# Patient Record
Sex: MALE | Race: WHITE | NOT HISPANIC OR LATINO | Employment: FULL TIME | URBAN - METROPOLITAN AREA
[De-identification: names, ages, dates, MRNs, and addresses within clinical notes are randomized per-mention and may not be internally consistent; named-entity substitution may affect disease eponyms.]

---

## 2018-12-10 ENCOUNTER — OFFICE VISIT (OUTPATIENT)
Dept: URGENT CARE | Facility: CLINIC | Age: 48
End: 2018-12-10
Payer: COMMERCIAL

## 2018-12-10 ENCOUNTER — HOSPITAL ENCOUNTER (OUTPATIENT)
Dept: RADIOLOGY | Facility: MEDICAL CENTER | Age: 48
End: 2018-12-10
Attending: PHYSICIAN ASSISTANT
Payer: COMMERCIAL

## 2018-12-10 VITALS
HEART RATE: 88 BPM | WEIGHT: 210 LBS | TEMPERATURE: 98.2 F | OXYGEN SATURATION: 98 % | HEIGHT: 70 IN | DIASTOLIC BLOOD PRESSURE: 85 MMHG | SYSTOLIC BLOOD PRESSURE: 140 MMHG | RESPIRATION RATE: 20 BRPM | BODY MASS INDEX: 30.06 KG/M2

## 2018-12-10 DIAGNOSIS — S67.191A CRUSHING INJURY OF LEFT INDEX FINGER, INITIAL ENCOUNTER: ICD-10-CM

## 2018-12-10 DIAGNOSIS — S62.639B OPEN FRACTURE OF TUFT OF DISTAL PHALANX OF FINGER: Primary | ICD-10-CM

## 2018-12-10 PROCEDURE — 73140 X-RAY EXAM OF FINGER(S): CPT | Mod: LT

## 2018-12-10 PROCEDURE — 99204 OFFICE O/P NEW MOD 45 MIN: CPT | Performed by: PHYSICIAN ASSISTANT

## 2018-12-10 RX ORDER — HYDROCODONE BITARTRATE AND ACETAMINOPHEN 5; 325 MG/1; MG/1
1-2 TABLET ORAL EVERY 4 HOURS PRN
Qty: 20 TAB | Refills: 0 | Status: SHIPPED | OUTPATIENT
Start: 2018-12-10 | End: 2018-12-13

## 2018-12-10 RX ORDER — SULFAMETHOXAZOLE AND TRIMETHOPRIM 800; 160 MG/1; MG/1
1 TABLET ORAL 2 TIMES DAILY
Qty: 14 TAB | Refills: 0 | Status: SHIPPED | OUTPATIENT
Start: 2018-12-10 | End: 2018-12-17

## 2018-12-10 NOTE — PATIENT INSTRUCTIONS
Crush Injury, Fingers or Toes  A crush injury to the fingers or toes means the tissues have been damaged by being squeezed (compressed). There will be bleeding into the tissues and swelling. Often, blood will collect under the skin. When this happens, the skin on the finger often dies and may slough off (shed) 1 week to 10 days later. Usually, new skin is growing underneath. If the injury has been too severe and the tissue does not survive, the damaged tissue may begin to turn black over several days.   Wounds which occur because of the crushing may be stitched (sutured) shut. However, crush injuries are more likely to become infected than other injuries. These wounds may not be closed as tightly as other types of cuts to prevent infection. Nails involved are often lost. These usually grow back over several weeks.   DIAGNOSIS  X-rays may be taken to see if there is any injury to the bones.  TREATMENT  Broken bones (fractures) may be treated with splinting, depending on the fracture. Often, no treatment is required for fractures of the last bone in the fingers or toes.  HOME CARE INSTRUCTIONS   · The crushed part should be raised (elevated) above the heart or center of the chest as much as possible for the first several days or as directed. This helps with pain and lessens swelling. Less swelling increases the chances that the crushed part will survive.  · Put ice on the injured area.  ¨ Put ice in a plastic bag.  ¨ Place a towel between your skin and the bag.  ¨ Leave the ice on for 15-20 minutes, 03-04 times a day for the first 2 days.  · Only take over-the-counter or prescription medicines for pain, discomfort, or fever as directed by your caregiver.  · Use your injured part only as directed.  · Change your bandages (dressings) as directed.  · Keep all follow-up appointments as directed by your caregiver. Not keeping your appointment could result in a chronic or permanent injury, pain, and disability. If there is  any problem keeping the appointment, you must call to reschedule.  SEEK IMMEDIATE MEDICAL CARE IF:   · There is redness, swelling, or increasing pain in the wound area.  · Pus is coming from the wound.  · You have a fever.  · You notice a bad smell coming from the wound or dressing.  · The edges of the wound do not stay together after the sutures have been removed.  · You are unable to move the injured finger or toe.  MAKE SURE YOU:   · Understand these instructions.  · Will watch your condition.  · Will get help right away if you are not doing well or get worse.     This information is not intended to replace advice given to you by your health care provider. Make sure you discuss any questions you have with your health care provider.     Document Released: 12/18/2006 Document Revised: 03/11/2013 Document Reviewed: 05/04/2012  [employer] Interactive Patient Education ©2016 [employer]

## 2018-12-10 NOTE — PROGRESS NOTES
Subjective:      Pt is a 48 y.o. male who presents with Finger Injury (Smash left infdex last night .)            HPI  This is a new problem. Pt notes while hammering a nail last night at home he missed the nail and smashed his left index finger causing damage to the nail and bleeding of the finger with crush injury to the finger tip and acryillic nail he is wearing shattered in pieces. Pt has not taken any Rx medications for this condition. Pt states the pain is a 5/10, aching in nature and worse at night. Pt denies CP, SOB, NVD, paresthesias, headaches, dizziness, change in vision, hives, or other joint pain. The pt's medication list, problem list, and allergies have been evaluated and reviewed during today's visit.      PMH:  Negative per pt.      PSH:  Negative per pt.      Fam Hx:  the patient's family history is not pertinent to their current complaint    Soc HX:  Social History     Social History   • Marital status:      Spouse name: N/A   • Number of children: N/A   • Years of education: N/A     Occupational History   • Not on file.     Social History Main Topics   • Smoking status: Never Smoker   • Smokeless tobacco: Former User     Types: Chew     Quit date: 1/10/2010   • Alcohol use Not on file   • Drug use: Unknown   • Sexual activity: Not on file     Other Topics Concern   • Not on file     Social History Narrative   • No narrative on file         Medications:    Current Outpatient Prescriptions:   •  sulfamethoxazole-trimethoprim (BACTRIM DS) 800-160 MG tablet, Take 1 Tab by mouth 2 times a day for 7 days., Disp: 14 Tab, Rfl: 0      Allergies:  Patient has no known allergies.    ROS  Constitutional: Negative for fever, chills and malaise/fatigue.   HENT: Negative for congestion and sore throat.    Eyes: Negative for blurred vision, double vision and photophobia.   Respiratory: Negative for cough and shortness of breath.  Cardiovascular: Negative for chest pain and palpitations.    "  Gastrointestinal: Negative for heartburn, nausea, vomiting, abdominal pain, diarrhea and constipation.   Genitourinary: Negative for dysuria and flank pain.   Musculoskeletal: POS for left index finger crush injury with joint pain and myalgias.   Skin: Negative for itching and rash.   Neurological: Negative for dizziness, tingling and headaches.   Endo/Heme/Allergies: Does not bruise/bleed easily.   Psychiatric/Behavioral: Negative for depression. The patient is not nervous/anxious.           Objective:     /85 (BP Location: Left arm, Patient Position: Sitting, BP Cuff Size: Adult)   Pulse 88   Temp 36.8 °C (98.2 °F) (Temporal)   Resp 20   Ht 1.778 m (5' 10\")   Wt 95.3 kg (210 lb)   SpO2 98%   BMI 30.13 kg/m²      Physical Exam   Musculoskeletal:        Left hand: He exhibits decreased range of motion, tenderness, bony tenderness, disruption of two-point discrimination, deformity, laceration and swelling. He exhibits normal capillary refill. Normal sensation noted. Normal strength noted. He exhibits no finger abduction, no thumb/finger opposition and no wrist extension trouble.        Hands:        Constitutional: PT is oriented to person, place, and time. PT appears well-developed and well-nourished. No distress.   HENT:   Head: Normocephalic and atraumatic.   Mouth/Throat: Oropharynx is clear and moist. No oropharyngeal exudate.   Eyes: Conjunctivae normal and EOM are normal. Pupils are equal, round, and reactive to light.   Neck: Normal range of motion. Neck supple. No thyromegaly present.   Cardiovascular: Normal rate, regular rhythm, normal heart sounds and intact distal pulses.  Exam reveals no gallop and no friction rub.    No murmur heard.  Pulmonary/Chest: Effort normal and breath sounds normal. No respiratory distress. PT has no wheezes. PT has no rales. Pt exhibits no tenderness.   Abdominal: Soft. Bowel sounds are normal. PT exhibits no distension and no mass. There is no tenderness. There " is no rebound and no guarding.   Neurological: PT is alert and oriented to person, place, and time. PT has normal reflexes. No cranial nerve deficit.   Skin: Skin is warm and dry. No rash noted. PT is not diaphoretic. No erythema.       Psychiatric: PT has a normal mood and affect. PT behavior is normal. Judgment and thought content normal.     RADS:  Narrative       12/10/2018 9:03 AM    HISTORY/REASON FOR EXAM:  Pain/Deformity Following Trauma.  Crush injury.    TECHNIQUE/EXAM DESCRIPTION AND NUMBER OF VIEWS:  3 views of the LEFT fingers.    COMPARISON: None    FINDINGS:  There is a comminuted fracture of the distal phalangeal tuft of the left index finger which is compatible with history of crush injury area the fracture fragments are not significantly displaced. There appears to be soft tissue injury along the dorsum of   the area of fracture.   Impression       Comminuted, nondisplaced fracture of the distal phalangeal tuft of the left index finger with adjacent soft tissue injury.   Reading Provider Reading Date   Shante Roman M.D. Dec 10, 2018   Signing Provider Signing Date Signing Time   Shante Roman M.D. Dec 10, 2018  9:12 AM          Assessment/Plan:     1. OPEN Left index finger tuft fx- comminuted    2. Crushing injury of left index finger, initial encounter    - DX-FINGER(S) 2+ LEFT; Future  - sulfamethoxazole-trimethoprim (BACTRIM DS) 800-160 MG tablet; Take 1 Tab by mouth 2 times a day for 7 days.  Dispense: 14 Tab; Refill: 0  Opioid consent  NSAIDs for pain 1-5, Norco for pain 6-10 or to help get to sleep.  [facility] Aware web site evaluation: I have obtained and reviewed patient utilization report from [facility] pharmacy database prior to writing prescription for controlled substance II, III or IV per [place] bill . Based on the report and my clinical assessment the prescription is medically necessary.   RICE therapy discussed  Ref to sports med placed--> to see on 12/11/18 at 2 pm for  Dr. Patton  Spoke with Dr. Gansert at the ER and he agreed with the plan  Gentle ROM exercises discussed  WBAT left hand with caution  Ice/heat therapy discussed  Rest, fluids encouraged.  AVS with medical info given.  Pt was in full understanding and agreement with the plan.  Follow-up as needed if symptoms worsen or fail to improve.

## 2018-12-11 ENCOUNTER — OFFICE VISIT (OUTPATIENT)
Dept: MEDICAL GROUP | Facility: CLINIC | Age: 48
End: 2018-12-11
Payer: COMMERCIAL

## 2018-12-11 VITALS
TEMPERATURE: 98.7 F | HEART RATE: 86 BPM | HEIGHT: 70 IN | SYSTOLIC BLOOD PRESSURE: 126 MMHG | WEIGHT: 210 LBS | OXYGEN SATURATION: 95 % | BODY MASS INDEX: 30.06 KG/M2 | RESPIRATION RATE: 18 BRPM | DIASTOLIC BLOOD PRESSURE: 84 MMHG

## 2018-12-11 DIAGNOSIS — S62.639B OPEN FRACTURE OF TUFT OF DISTAL PHALANX OF FINGER: ICD-10-CM

## 2018-12-11 PROCEDURE — 26750 TREAT FINGER FRACTURE EACH: CPT | Mod: F1 | Performed by: FAMILY MEDICINE

## 2018-12-11 ASSESSMENT — ENCOUNTER SYMPTOMS
NAUSEA: 0
HEADACHES: 0
VOMITING: 0
FEVER: 0
CHILLS: 0
SHORTNESS OF BREATH: 0
DIZZINESS: 0

## 2018-12-11 NOTE — PROGRESS NOTES
"Subjective:      Baldemar Garcia is a 48 y.o. male who presents with Finger Injury (Referral from / L index finger injury )      Referred by Luis Alvarenga PA-C for evaluation of LEFT index finger pain     HPI   LEFT index finger pain   Date of injury, Sunday, December 9, 2018  Hammering a nail at home and struck his LEFT index finger inadvertently with the hammer  Sudden sharp pain at the LEFT index finger  No radiation  Improved with rest and immobilization  Worse with pressure or movement  No night symptoms  Denies any prior issues with the LEFT index finger  Taking ibuprofen for pain which is helping    Review of Systems   Constitutional: Negative for chills and fever.   Respiratory: Negative for shortness of breath.    Cardiovascular: Negative for chest pain.   Gastrointestinal: Negative for nausea and vomiting.   Neurological: Negative for dizziness and headaches.     PMH:  has no past medical history on file.  MEDS:   Current Outpatient Prescriptions:   •  sulfamethoxazole-trimethoprim (BACTRIM DS) 800-160 MG tablet, Take 1 Tab by mouth 2 times a day for 7 days., Disp: 14 Tab, Rfl: 0  •  HYDROcodone-acetaminophen (NORCO) 5-325 MG Tab per tablet, Take 1-2 Tabs by mouth every four hours as needed for up to 3 days., Disp: 20 Tab, Rfl: 0  ALLERGIES: No Known Allergies  SURGHX:   Past Surgical History:   Procedure Laterality Date   • FEMUR ORIF Right     femur   • ORCHIECTOMY Right      SOCHX:  reports that he has never smoked. He quit smokeless tobacco use about 8 years ago. His smokeless tobacco use included Chew.  FH: Family history was reviewed, no pertinent findings to report     Objective:     /84 (BP Location: Right arm, Patient Position: Sitting, BP Cuff Size: Adult)   Pulse 86   Temp 37.1 °C (98.7 °F) (Temporal)   Resp 18   Ht 1.778 m (5' 10\")   Wt 95.3 kg (210 lb)   SpO2 95%   BMI 30.13 kg/m²       Physical Exam     Hand exam    NAD  Alert and oriented    BILATERAL hand " exam  POSITIVE swelling at the tip of the LEFT index finger  NO deformity  Range of motion of all MCP, DIP and PIP joints NORMAL  Grind test is NEGATIVE  Collateral ligament testing is NORMAL       Assessment/Plan:     1. Open fracture of tuft of distal phalanx of finger      LEFT index     Continue fracture stabilization in aluminum finger splint  Splint was adjusted to allow flexion and extension at the PIP joint    Date of injury, Sunday, December 9, 2018  The plan is to continue finger immobilization for a total of 6 weeks   (removal on or after January 22, 2019)    Return in about 3 days (around 12/14/2018).  For wound check          12/10/2018 9:03 AM    HISTORY/REASON FOR EXAM:  Pain/Deformity Following Trauma.  Crush injury.    TECHNIQUE/EXAM DESCRIPTION AND NUMBER OF VIEWS:  3 views of the LEFT fingers.    COMPARISON: None    FINDINGS:  There is a comminuted fracture of the distal phalangeal tuft of the left index finger which is compatible with history of crush injury area the fracture fragments are not significantly displaced. There appears to be soft tissue injury along the dorsum of   the area of fracture.   Impression       Comminuted, nondisplaced fracture of the distal phalangeal tuft of the left index finger with adjacent soft tissue injury.     Interpreted in the office today with the patient    Thank you Luis Alvarenga PA-C for allowing me to participate in caring for the patient.

## 2018-12-14 ENCOUNTER — OFFICE VISIT (OUTPATIENT)
Dept: MEDICAL GROUP | Facility: CLINIC | Age: 48
End: 2018-12-14
Payer: COMMERCIAL

## 2018-12-14 VITALS
WEIGHT: 210 LBS | TEMPERATURE: 98.1 F | BODY MASS INDEX: 30.06 KG/M2 | HEIGHT: 70 IN | RESPIRATION RATE: 16 BRPM | OXYGEN SATURATION: 97 % | SYSTOLIC BLOOD PRESSURE: 120 MMHG | DIASTOLIC BLOOD PRESSURE: 76 MMHG | HEART RATE: 82 BPM

## 2018-12-14 DIAGNOSIS — S62.639B OPEN FRACTURE OF TUFT OF DISTAL PHALANX OF FINGER: ICD-10-CM

## 2018-12-14 DIAGNOSIS — T14.8XXA OPEN WOUND: ICD-10-CM

## 2018-12-14 PROCEDURE — 99212 OFFICE O/P EST SF 10 MIN: CPT | Mod: 24 | Performed by: FAMILY MEDICINE

## 2018-12-15 NOTE — PROGRESS NOTES
"Subjective:      FOLLOW up LEFT index finger pain     HPI   LEFT index finger distal tuft fracture  Date of injury, Sunday, December 9, 2018  Hammering a nail at home and struck his LEFT index finger inadvertently with the hammer  Pain is same     Objective:     /76 (BP Location: Right arm, Patient Position: Sitting, BP Cuff Size: Adult)   Pulse 82   Temp 36.7 °C (98.1 °F) (Temporal)   Resp 16   Ht 1.778 m (5' 10\")   Wt 95.3 kg (210 lb)   SpO2 97%   BMI 30.13 kg/m²     Physical Exam     Hand exam    NAD  Alert and oriented    BILATERAL hand exam  POSITIVE swelling at the tip of the LEFT index finger  NO deformity  Range of motion of all MCP, DIP and PIP joints NORMAL  Grind test is NEGATIVE  Collateral ligament testing is NORMAL     Assessment/Plan:     1. Open fracture of tuft of distal phalanx of finger     2. Open wound  REFERRAL TO WOUND CLINIC     Continue fracture stabilization in aluminum finger splint    Date of injury, Sunday, December 9, 2018  The plan is to continue finger immobilization for a total of 6 weeks   (removal on or after January 22, 2019)    Would is looking good, but due to extent recommend wound care consult    Return in about 1 week          12/10/2018 9:03 AM    HISTORY/REASON FOR EXAM:  Pain/Deformity Following Trauma.  Crush injury.    TECHNIQUE/EXAM DESCRIPTION AND NUMBER OF VIEWS:  3 views of the LEFT fingers.    COMPARISON: None    FINDINGS:  There is a comminuted fracture of the distal phalangeal tuft of the left index finger which is compatible with history of crush injury area the fracture fragments are not significantly displaced. There appears to be soft tissue injury along the dorsum of   the area of fracture.   Impression       Comminuted, nondisplaced fracture of the distal phalangeal tuft of the left index finger with adjacent soft tissue injury.     Thank you Luis Alvarenga PA-C for allowing me to participate in caring for the patient.  "

## 2018-12-21 ENCOUNTER — OFFICE VISIT (OUTPATIENT)
Dept: MEDICAL GROUP | Facility: CLINIC | Age: 48
End: 2018-12-21
Payer: COMMERCIAL

## 2018-12-21 ENCOUNTER — APPOINTMENT (OUTPATIENT)
Dept: RADIOLOGY | Facility: IMAGING CENTER | Age: 48
End: 2018-12-21
Attending: FAMILY MEDICINE
Payer: COMMERCIAL

## 2018-12-21 VITALS
HEART RATE: 82 BPM | OXYGEN SATURATION: 97 % | RESPIRATION RATE: 16 BRPM | TEMPERATURE: 98.2 F | SYSTOLIC BLOOD PRESSURE: 122 MMHG | BODY MASS INDEX: 30.06 KG/M2 | DIASTOLIC BLOOD PRESSURE: 78 MMHG | HEIGHT: 70 IN | WEIGHT: 210 LBS

## 2018-12-21 DIAGNOSIS — S62.639B OPEN FRACTURE OF TUFT OF DISTAL PHALANX OF FINGER: ICD-10-CM

## 2018-12-21 PROCEDURE — 73140 X-RAY EXAM OF FINGER(S): CPT | Mod: 26,LT | Performed by: FAMILY MEDICINE

## 2018-12-21 PROCEDURE — 99024 POSTOP FOLLOW-UP VISIT: CPT | Performed by: FAMILY MEDICINE

## 2018-12-21 NOTE — PROGRESS NOTES
"Subjective:      FOLLOW up LEFT index finger pain     HPI   LEFT index finger distal tuft fracture  Date of injury, Sunday, December 9, 2018  Hammering a nail at home and struck his LEFT index finger inadvertently with the hammer  Pain is improved     Objective:     /78 (BP Location: Right arm, Patient Position: Sitting, BP Cuff Size: Adult)   Pulse 82   Temp 36.8 °C (98.2 °F) (Temporal)   Resp 16   Ht 1.778 m (5' 10\")   Wt 95.3 kg (210 lb)   SpO2 97%   BMI 30.13 kg/m²     Physical Exam     Hand exam    NAD  Alert and oriented    BILATERAL hand exam  MINIMAL swelling at the tip of the LEFT index finger  NO deformity  Range of motion of all MCP, DIP and PIP joints NORMAL  Grind test is NEGATIVE  Collateral ligament testing is NORMAL     Assessment/Plan:     1. Open fracture of tuft of distal phalanx of finger  DX-FINGER(S) 2+ LEFT    REFERRAL TO HAND SURGERY     Continue fracture stabilization in aluminum finger splint    Date of injury, Sunday, December 9, 2018  The plan is to continue finger immobilization for a total of 6 weeks   (removal on or after January 22, 2019)    wound care consult \"pending\", and not yet improved  Since this is an open fracture, and repeat lateral x-rays demonstrate the possibility of bone exposure placing referral for orthopedic hand specialist evaluation    Return in about 1 week    12/21/2018 8:41 AM    HISTORY/REASON FOR EXAM:  Second distal phalangeal fracture.      TECHNIQUE/EXAM DESCRIPTION AND NUMBER OF VIEWS:  3 views of the  LEFT index finger.    COMPARISON: 12/10/2019    FINDINGS:    MINERALIZATION: Mineralization is unremarkable for age.    INJURY: Comminuted fracture of the distal tuft of the distal phalanx of the index finger is redemonstrated. Fragments are in unchanged position. There is no adjacent aggressive periosteal reaction or appreciable callus formation. There is overlying soft   tissue irregularity.    JOINTS: No erosive arthropathy is evident.       "   Impression       Comminuted fracture of the distal tuft of the distal phalanx of the index finger. Infection is not excluded by conventional radiographs.     Interpreted in the office today with the patient          12/10/2018 9:03 AM    HISTORY/REASON FOR EXAM:  Pain/Deformity Following Trauma.  Crush injury.    TECHNIQUE/EXAM DESCRIPTION AND NUMBER OF VIEWS:  3 views of the LEFT fingers.    COMPARISON: None    FINDINGS:  There is a comminuted fracture of the distal phalangeal tuft of the left index finger which is compatible with history of crush injury area the fracture fragments are not significantly displaced. There appears to be soft tissue injury along the dorsum of   the area of fracture.   Impression       Comminuted, nondisplaced fracture of the distal phalangeal tuft of the left index finger with adjacent soft tissue injury.     Thank you Luis Alvarenga PA-C for allowing me to participate in caring for the patient.